# Patient Record
Sex: MALE | Race: WHITE | NOT HISPANIC OR LATINO | Employment: OTHER | URBAN - METROPOLITAN AREA
[De-identification: names, ages, dates, MRNs, and addresses within clinical notes are randomized per-mention and may not be internally consistent; named-entity substitution may affect disease eponyms.]

---

## 2023-02-09 ENCOUNTER — OFFICE VISIT (OUTPATIENT)
Dept: GASTROENTEROLOGY | Facility: CLINIC | Age: 63
End: 2023-02-09
Payer: COMMERCIAL

## 2023-02-09 ENCOUNTER — TELEPHONE (OUTPATIENT)
Dept: GASTROENTEROLOGY | Facility: CLINIC | Age: 63
End: 2023-02-09

## 2023-02-09 VITALS
SYSTOLIC BLOOD PRESSURE: 132 MMHG | HEIGHT: 71 IN | WEIGHT: 206.4 LBS | BODY MASS INDEX: 28.9 KG/M2 | DIASTOLIC BLOOD PRESSURE: 86 MMHG

## 2023-02-09 DIAGNOSIS — K21.9 GASTROESOPHAGEAL REFLUX DISEASE, UNSPECIFIED WHETHER ESOPHAGITIS PRESENT: ICD-10-CM

## 2023-02-09 DIAGNOSIS — Z12.11 ENCOUNTER FOR SCREENING FOR MALIGNANT NEOPLASM OF COLON: ICD-10-CM

## 2023-02-09 DIAGNOSIS — K58.2 IRRITABLE BOWEL SYNDROME WITH BOTH CONSTIPATION AND DIARRHEA: Primary | ICD-10-CM

## 2023-02-09 DIAGNOSIS — R10.13 DYSPEPSIA: ICD-10-CM

## 2023-02-09 PROCEDURE — 99204 OFFICE O/P NEW MOD 45 MIN: CPT | Performed by: INTERNAL MEDICINE

## 2023-02-09 RX ORDER — FENOFIBRATE 160 MG/1
1 TABLET ORAL DAILY
COMMUNITY
Start: 2023-01-10 | End: 2023-02-09

## 2023-02-09 RX ORDER — PREDNISONE 20 MG/1
TABLET ORAL
COMMUNITY
Start: 2023-02-06

## 2023-02-09 RX ORDER — ALBUTEROL SULFATE 90 UG/1
AEROSOL, METERED RESPIRATORY (INHALATION)
COMMUNITY
Start: 2023-02-07

## 2023-02-09 RX ORDER — DICYCLOMINE HYDROCHLORIDE 10 MG/1
CAPSULE ORAL
COMMUNITY
Start: 2023-01-30

## 2023-02-09 RX ORDER — BACLOFEN 20 MG/1
TABLET ORAL
COMMUNITY
Start: 2022-11-16

## 2023-02-09 RX ORDER — FLUOCINONIDE TOPICAL SOLUTION USP, 0.05% 0.5 MG/ML
SOLUTION TOPICAL
COMMUNITY
Start: 2023-02-01

## 2023-02-09 RX ORDER — OMEPRAZOLE 40 MG/1
40 CAPSULE, DELAYED RELEASE ORAL DAILY
COMMUNITY
Start: 2022-03-02 | End: 2023-03-02

## 2023-02-09 RX ORDER — KETOCONAZOLE 20 MG/G
CREAM TOPICAL
COMMUNITY
Start: 2023-02-01

## 2023-02-09 RX ORDER — AZITHROMYCIN 250 MG/1
TABLET, FILM COATED ORAL
COMMUNITY
Start: 2023-02-06

## 2023-02-09 RX ORDER — CRISABOROLE 20 MG/G
OINTMENT TOPICAL SEE ADMIN INSTRUCTIONS
COMMUNITY
Start: 2023-02-03

## 2023-02-09 RX ORDER — ONDANSETRON 4 MG/1
TABLET, FILM COATED ORAL
COMMUNITY
Start: 2023-01-23 | End: 2023-02-09

## 2023-02-09 NOTE — TELEPHONE ENCOUNTER
Seen in the office today by Dr. Ferrara.  Scheduled EGD & Colonoscopy at [facility] 08/11/2023 at 12:30pm - arrive 11:30am.  Will mail instructions 2 months prior.

## 2023-02-09 NOTE — PROGRESS NOTES
"    PATIENT INFORMATION  Bryce Rai       - 1960    CHIEF COMPLAINT  Chief Complaint   Patient presents with   • Abdominal Pain   • Nausea   • Vomiting   • Constipation   • Diarrhea       HISTORY OF PRESENT ILLNESS  Here for Gi eval after his last eval was by Dr Felipe in 2014    His PPI is PRN wilfrid when he eats late and has had nocturnal regurgitation but has been eating better and earlier so only has to take PRN    Describes his regular BMs were 4-6 times a day but after his C-Spine surgery got back to his constipation but has skipped a day or 2 but only a rare dulcolax to get things going    Will then skip another day and replay the pattern.    Is on Abx for a \"lung infection\" NOT Pneumonia - was in [facility]- so no avilable records      REVIEWED PERTINENT RESULTS/ LABS  No results found for: CASEREPORT, FINALDX  Lab Results   Component Value Date    HGB 14.6 2021    MCV 92.1 2021     2021    ALT 49 2021    AST 34 2021    HGBA1C 5.1 2019    INR 1.1 2021    TRIG 147 2020      No results found.    REVIEW OF SYSTEMS  Review of Systems   Constitutional: Positive for chills and diaphoresis.   HENT: Negative.    Eyes: Negative.    Respiratory: Positive for cough, shortness of breath and wheezing.    Cardiovascular: Negative.    Gastrointestinal: Positive for abdominal distention, abdominal pain, constipation, diarrhea, nausea and vomiting.   Endocrine: Negative.    Genitourinary: Negative.    Musculoskeletal: Positive for arthralgias, back pain, joint swelling, myalgias, neck pain and neck stiffness.   Skin: Negative.    Allergic/Immunologic: Positive for environmental allergies.   Neurological: Negative.  Negative for syncope.   Hematological: Bruises/bleeds easily.   Psychiatric/Behavioral: Negative.          ACTIVE PROBLEMS  There are no problems to display for this patient.        PAST MEDICAL HISTORY  Past Medical History:   Diagnosis Date   • " Diverticulitis of colon    • Irritable bowel syndrome          SURGICAL HISTORY  Past Surgical History:   Procedure Laterality Date   • ACHILLES TENDON REPAIR Left    • APPENDECTOMY     • BACK SURGERY      L1-L2 fusion   • CHOLECYSTECTOMY     • HAND SURGERY Right    • NECK SURGERY           FAMILY HISTORY  Family History   Problem Relation Age of Onset   • Melanoma Mother          SOCIAL HISTORY  Social History     Occupational History   • Not on file   Tobacco Use   • Smoking status: Never     Passive exposure: Never   • Smokeless tobacco: Never   Vaping Use   • Vaping Use: Never used   Substance and Sexual Activity   • Alcohol use: Not Currently     Comment: hasnt had drink over 25 years   • Drug use: Never   • Sexual activity: Not Currently         CURRENT MEDICATIONS    Current Outpatient Medications:   •  azithromycin (ZITHROMAX) 250 MG tablet, , Disp: , Rfl:   •  baclofen (LIORESAL) 20 MG tablet, TAKE 1 TABLET BY MOUTH THREE TIMES DAILY AS NEEDED FOR NECK SPASM, Disp: , Rfl:   •  dicyclomine (BENTYL) 10 MG capsule, TAKE 1 CAPSULE BY MOUTH FOUR TIMES DAILY BEFORE MEALS AND AT NIGHT, Disp: , Rfl:   •  Eucrisa 2 % ointment, Apply  topically to the appropriate area as directed See Admin Instructions. Apply topically to the affected area twice daily, Disp: , Rfl:   •  fluocinonide (LIDEX) 0.05 % external solution, APPLY SEVERAL DROPS TO THE SCALP DAILY AS NEEDED FOR FLARES, Disp: , Rfl:   •  hydrocortisone 2.5 % cream, APPLY THIN LAYER TOPICALLY TO FACE TWICE DAILY AS NEEDED FOR FLARES, Disp: , Rfl:   •  ketoconazole (NIZORAL) 2 % cream, APPLY THIN LAYER TO RASH ON FACE AND EARS TWICE DAILY AS NEEDED FOR FLARES, Disp: , Rfl:   •  omeprazole (priLOSEC) 40 MG capsule, Take 40 mg by mouth Daily., Disp: , Rfl:   •  predniSONE (DELTASONE) 20 MG tablet, TAKE 1 TABLET BY MOUTH TWICE DAILY UNTIL GONE, Disp: , Rfl:   •  Ventolin  (90 Base) MCG/ACT inhaler, , Disp: , Rfl:     ALLERGIES  Neomycin and  "Penicillins    VITALS  Vitals:    02/09/23 0930   BP: 132/86   BP Location: Right arm   Patient Position: Sitting   Cuff Size: Adult   Weight: 93.6 kg (206 lb 6.4 oz)   Height: 180.3 cm (71\")       PHYSICAL EXAM  Debilities/Disabilities Identified: None  Emotional Behavior: Appropriate  Wt Readings from Last 3 Encounters:   02/09/23 93.6 kg (206 lb 6.4 oz)     Ht Readings from Last 1 Encounters:   02/09/23 180.3 cm (71\")     Body mass index is 28.79 kg/m².  Physical Exam  Constitutional:       Appearance: He is well-developed. He is not diaphoretic.   Eyes:      General: No scleral icterus.     Conjunctiva/sclera: Conjunctivae normal.      Pupils: Pupils are equal, round, and reactive to light.   Neck:      Thyroid: No thyromegaly.   Cardiovascular:      Rate and Rhythm: Normal rate and regular rhythm.      Heart sounds: Normal heart sounds. No murmur heard.    No gallop.   Pulmonary:      Effort: Pulmonary effort is normal.      Breath sounds: Normal breath sounds. No wheezing or rales.   Abdominal:      General: Bowel sounds are normal. There is no distension or abdominal bruit.      Palpations: Abdomen is soft. There is no shifting dullness, fluid wave or mass.      Tenderness: There is abdominal tenderness in the right lower quadrant, epigastric area and left upper quadrant. There is no guarding. Negative signs include Samson's sign.      Hernia: There is no hernia in the ventral area.   Musculoskeletal:         General: Normal range of motion.      Cervical back: Normal range of motion and neck supple.   Lymphadenopathy:      Cervical: No cervical adenopathy.   Skin:     General: Skin is warm and dry.      Findings: No erythema or rash.   Neurological:      Mental Status: He is alert and oriented to person, place, and time.         CLINICAL DATA REVIEWED   reviewed previous lab results and integrated with today's visit, reviewed notes from other physicians and/or last GI encounter, reviewed previous endoscopy " results and available photos, reviewed surgical pathology results from previous biopsies    ASSESSMENT  Diagnoses and all orders for this visit:    Irritable bowel syndrome with both constipation and diarrhea    Dyspepsia  -     Case Request; Standing  -     Case Request    Gastroesophageal reflux disease, unspecified whether esophagitis present    Encounter for screening for malignant neoplasm of colon  -     Case Request; Standing  -     Case Request    Other orders  -     Ventolin  (90 Base) MCG/ACT inhaler  -     azithromycin (ZITHROMAX) 250 MG tablet  -     baclofen (LIORESAL) 20 MG tablet; TAKE 1 TABLET BY MOUTH THREE TIMES DAILY AS NEEDED FOR NECK SPASM  -     Eucrisa 2 % ointment; Apply  topically to the appropriate area as directed See Admin Instructions. Apply topically to the affected area twice daily  -     dicyclomine (BENTYL) 10 MG capsule; TAKE 1 CAPSULE BY MOUTH FOUR TIMES DAILY BEFORE MEALS AND AT NIGHT  -     Discontinue: fenofibrate 160 MG tablet; Take 1 tablet by mouth Daily.  -     fluocinonide (LIDEX) 0.05 % external solution; APPLY SEVERAL DROPS TO THE SCALP DAILY AS NEEDED FOR FLARES  -     hydrocortisone 2.5 % cream; APPLY THIN LAYER TOPICALLY TO FACE TWICE DAILY AS NEEDED FOR FLARES  -     ketoconazole (NIZORAL) 2 % cream; APPLY THIN LAYER TO RASH ON FACE AND EARS TWICE DAILY AS NEEDED FOR FLARES  -     omeprazole (priLOSEC) 40 MG capsule; Take 40 mg by mouth Daily.  -     Discontinue: ondansetron (ZOFRAN) 4 MG tablet; TAKE 1 TABLET BY MOUTH EVERY 8 HOURS FOR UP TO 7 DAYS AS NEEDED FOR NAUSEA  -     predniSONE (DELTASONE) 20 MG tablet; TAKE 1 TABLET BY MOUTH TWICE DAILY UNTIL GONE  -     Follow Anesthesia Guidelines / Protocol; Future  -     Obtain Informed Consent; Standing          PLAN  Would encourage daily Fiber and Probiotic as wel as increasing his Fluid intake and aamir proceed to his Endoscpy    Return in about 4 months (around 6/9/2023).    I have discussed the above plan  with the patient.  They verbalize understanding and are in agreement with the plan.  They have been advised to contact the office for any questions, concerns, or changes related to their health.

## 2023-02-10 ENCOUNTER — PATIENT ROUNDING (BHMG ONLY) (OUTPATIENT)
Dept: GASTROENTEROLOGY | Facility: CLINIC | Age: 63
End: 2023-02-10
Payer: COMMERCIAL

## 2023-02-10 NOTE — PROGRESS NOTES
February 10, 2023    Hello, may I speak with Bryce Rai?    My name is mAy Haddad     I am  with [facility]  [address]  [facility] [address] [phone].    Before we get started may I verify your date of birth? 1960    I am calling to officially welcome you to our practice and ask about your recent visit. Is this a good time to talk? yes    Tell me about your visit with us. What things went well?  the doctor was awesome, staff was very polite I would recommend them to anyone who needed a gastroenterologist.     We're always looking for ways to make our patients' experiences even better. Do you have recommendations on ways we may improve?  no    Overall were you satisfied with your first visit to our practice? yes       I appreciate you taking the time to speak with me today. Is there anything else I can do for you? no      Thank you, and have a great day.

## 2023-03-21 ENCOUNTER — TELEPHONE (OUTPATIENT)
Dept: GASTROENTEROLOGY | Facility: CLINIC | Age: 63
End: 2023-03-21
Payer: COMMERCIAL

## 2023-03-21 NOTE — TELEPHONE ENCOUNTER
Patient called and was requesting something for nausea.   LOV 2/9/2023    He was also transferred to [facility] to try and move up his scope.

## 2023-03-22 ENCOUNTER — TELEPHONE (OUTPATIENT)
Dept: GASTROENTEROLOGY | Facility: CLINIC | Age: 63
End: 2023-03-22
Payer: COMMERCIAL

## 2023-03-22 DIAGNOSIS — R11.0 NAUSEA: Primary | ICD-10-CM

## 2023-03-22 NOTE — TELEPHONE ENCOUNTER
PT called back needing to speak to someone regarding his appt on his Scope.    Transferred to [facility] for further assistane

## 2023-03-22 NOTE — TELEPHONE ENCOUNTER
Caller: Bryce Rai    Relationship to patient: Self    Best call back number: [phone]    Patient is needing: PATIENT WOULD LIKE TO RESCHEDULE HIS COLONOSCOPY FROM 3- TO THE NEXT AVAILABLE APPT.

## 2023-03-22 NOTE — TELEPHONE ENCOUNTER
Spoke with patient.  He states stomach issues are worse.  A lot of nausea and upset stomach.  He states on PPI, but could not remember the name, however none thing is listed on his medication list.    Rescheduled to [facility] 03/30/2023 arrive 12pm.  E-mail instructions [email or web address] today.

## 2023-03-28 ENCOUNTER — TELEPHONE (OUTPATIENT)
Dept: GASTROENTEROLOGY | Facility: CLINIC | Age: 63
End: 2023-03-28
Payer: COMMERCIAL

## 2023-03-28 NOTE — TELEPHONE ENCOUNTER
Records in chart.  Was given Keflex and Prednisone.    Offered to reschedule.      Please review and advise!

## 2023-03-28 NOTE — TELEPHONE ENCOUNTER
Patient called stating scheduled for  procedures on Thursday, 03/30/2023.  Went to [facility] for sinus infections, ear ache, chest congestion & cough on Thursday 03/23/2023 and again on Sunday 03/26/2023.  Was given antibiotics and Steroids .  He states coughing so bad it wakes me up at night.    Will call for records and check with Dr. Ferrara.    Called for records at [facility], spoke with Carmela.  Seen in ER 03/23/2023 and again 03/26/2023.  Will fax records

## 2023-03-28 NOTE — TELEPHONE ENCOUNTER
Spoke with patient.  Explained need to reschedule.    Scheduled at [facility] 06/09/2023 at 10:15am - arrive 9am.  Will mail new instructions.

## 2023-03-30 ENCOUNTER — TELEPHONE (OUTPATIENT)
Dept: GASTROENTEROLOGY | Facility: CLINIC | Age: 63
End: 2023-03-30
Payer: COMMERCIAL

## 2023-03-30 NOTE — TELEPHONE ENCOUNTER
Pt called stated that he feels weak, nauseated. No vomiting has had diarrhea.     Started antibiotic on 3-.     Would like something called in.

## 2023-03-30 NOTE — TELEPHONE ENCOUNTER
Patient currently on antibiotic and steroids for URI per PCP.  LM on patient VM to contact PCP and advise of weakness and nausea, as these symptoms may, actually, be related to the URI and the meds RXd for this.

## 2023-04-24 ENCOUNTER — TELEPHONE (OUTPATIENT)
Dept: GASTROENTEROLOGY | Facility: CLINIC | Age: 63
End: 2023-04-24
Payer: COMMERCIAL

## 2023-04-24 NOTE — TELEPHONE ENCOUNTER
PT called in feeling really sick.    He would like to see if he can get a med for his constant nausea and vomiting. Stated the last one he got cause a reaction and he couldn't continue it.    PT also would like to see if he can get his scheduled procedures moved up. He is having a lot of symptoms and feels really bad.  PT states he is constantly nauseated and vomiting, cramps, can't eat, goes days with out being about to eat anything, and is rapidly loosing weight, feel horrible.    Please call back to address the nausea meds and probability in moving his procedures up    C/b @[phone]

## 2023-04-25 DIAGNOSIS — R11.2 NAUSEA AND VOMITING, UNSPECIFIED VOMITING TYPE: Primary | ICD-10-CM

## 2023-04-25 DIAGNOSIS — R11.2 NAUSEA AND VOMITING, UNSPECIFIED VOMITING TYPE: ICD-10-CM

## 2023-04-25 DIAGNOSIS — K21.9 GASTROESOPHAGEAL REFLUX DISEASE, UNSPECIFIED WHETHER ESOPHAGITIS PRESENT: Primary | ICD-10-CM

## 2023-04-25 RX ORDER — OMEPRAZOLE 40 MG/1
40 CAPSULE, DELAYED RELEASE ORAL DAILY
Qty: 90 CAPSULE | Refills: 3 | Status: SHIPPED | OUTPATIENT
Start: 2023-04-25

## 2023-04-25 RX ORDER — PROMETHAZINE HYDROCHLORIDE 25 MG/1
25 SUPPOSITORY RECTAL EVERY 6 HOURS PRN
Qty: 30 SUPPOSITORY | Refills: 0 | Status: SHIPPED | OUTPATIENT
Start: 2023-04-25

## 2023-06-06 ENCOUNTER — TELEPHONE (OUTPATIENT)
Dept: GASTROENTEROLOGY | Facility: CLINIC | Age: 63
End: 2023-06-06
Payer: COMMERCIAL

## 2023-06-06 NOTE — TELEPHONE ENCOUNTER
Patient called for copy of his prep instructions.  He states never received any or unable to locate.  On 03/29/2023, were sent by [facility].    E-mailed to him [email or web address] today.

## 2023-06-08 ENCOUNTER — ANESTHESIA EVENT (OUTPATIENT)
Dept: PERIOP | Facility: HOSPITAL | Age: 63
End: 2023-06-08
Payer: COMMERCIAL

## 2023-06-09 ENCOUNTER — ANESTHESIA (OUTPATIENT)
Dept: PERIOP | Facility: HOSPITAL | Age: 63
End: 2023-06-09
Payer: COMMERCIAL

## 2023-06-09 ENCOUNTER — HOSPITAL ENCOUNTER (OUTPATIENT)
Facility: HOSPITAL | Age: 63
Setting detail: HOSPITAL OUTPATIENT SURGERY
Discharge: HOME | End: 2023-06-09
Attending: INTERNAL MEDICINE | Admitting: INTERNAL MEDICINE
Payer: COMMERCIAL

## 2023-06-09 VITALS
WEIGHT: 207.8 LBS | OXYGEN SATURATION: 98 % | DIASTOLIC BLOOD PRESSURE: 98 MMHG | SYSTOLIC BLOOD PRESSURE: 129 MMHG | HEIGHT: 71 IN | TEMPERATURE: 97.7 F | HEART RATE: 50 BPM | RESPIRATION RATE: 16 BRPM | BODY MASS INDEX: 29.09 KG/M2

## 2023-06-09 DIAGNOSIS — R10.13 DYSPEPSIA: ICD-10-CM

## 2023-06-09 DIAGNOSIS — Z12.11 ENCOUNTER FOR SCREENING FOR MALIGNANT NEOPLASM OF COLON: ICD-10-CM

## 2023-06-09 PROCEDURE — 45380 COLONOSCOPY AND BIOPSY: CPT | Performed by: INTERNAL MEDICINE

## 2023-06-09 PROCEDURE — 88305 TISSUE EXAM BY PATHOLOGIST: CPT | Performed by: INTERNAL MEDICINE

## 2023-06-09 PROCEDURE — 88313 SPECIAL STAINS GROUP 2: CPT | Performed by: INTERNAL MEDICINE

## 2023-06-09 PROCEDURE — 43239 EGD BIOPSY SINGLE/MULTIPLE: CPT | Performed by: INTERNAL MEDICINE

## 2023-06-09 PROCEDURE — 25010000002 PROPOFOL 200 MG/20ML EMULSION: Performed by: REGISTERED NURSE

## 2023-06-09 RX ORDER — PROPOFOL 10 MG/ML
INJECTION, EMULSION INTRAVENOUS AS NEEDED
Status: DISCONTINUED | OUTPATIENT
Start: 2023-06-09 | End: 2023-06-09 | Stop reason: SURG

## 2023-06-09 RX ORDER — SODIUM CHLORIDE 0.9 % (FLUSH) 0.9 %
10 SYRINGE (ML) INJECTION EVERY 12 HOURS SCHEDULED
Status: DISCONTINUED | OUTPATIENT
Start: 2023-06-09 | End: 2023-06-09 | Stop reason: HOSPADM

## 2023-06-09 RX ORDER — ONDANSETRON 2 MG/ML
4 INJECTION INTRAMUSCULAR; INTRAVENOUS ONCE AS NEEDED
Status: DISCONTINUED | OUTPATIENT
Start: 2023-06-09 | End: 2023-06-09 | Stop reason: HOSPADM

## 2023-06-09 RX ORDER — SODIUM CHLORIDE 9 MG/ML
40 INJECTION, SOLUTION INTRAVENOUS AS NEEDED
Status: DISCONTINUED | OUTPATIENT
Start: 2023-06-09 | End: 2023-06-09 | Stop reason: HOSPADM

## 2023-06-09 RX ORDER — LIDOCAINE HYDROCHLORIDE 20 MG/ML
INJECTION, SOLUTION INFILTRATION; PERINEURAL AS NEEDED
Status: DISCONTINUED | OUTPATIENT
Start: 2023-06-09 | End: 2023-06-09 | Stop reason: SURG

## 2023-06-09 RX ORDER — SODIUM CHLORIDE 0.9 % (FLUSH) 0.9 %
10 SYRINGE (ML) INJECTION AS NEEDED
Status: DISCONTINUED | OUTPATIENT
Start: 2023-06-09 | End: 2023-06-09 | Stop reason: HOSPADM

## 2023-06-09 RX ORDER — MEPERIDINE HYDROCHLORIDE 25 MG/ML
12.5 INJECTION INTRAMUSCULAR; INTRAVENOUS; SUBCUTANEOUS
Status: DISCONTINUED | OUTPATIENT
Start: 2023-06-09 | End: 2023-06-09 | Stop reason: HOSPADM

## 2023-06-09 RX ORDER — SODIUM CHLORIDE, SODIUM LACTATE, POTASSIUM CHLORIDE, CALCIUM CHLORIDE 600; 310; 30; 20 MG/100ML; MG/100ML; MG/100ML; MG/100ML
9 INJECTION, SOLUTION INTRAVENOUS CONTINUOUS PRN
Status: DISCONTINUED | OUTPATIENT
Start: 2023-06-09 | End: 2023-06-09 | Stop reason: HOSPADM

## 2023-06-09 RX ORDER — SODIUM CHLORIDE, SODIUM LACTATE, POTASSIUM CHLORIDE, CALCIUM CHLORIDE 600; 310; 30; 20 MG/100ML; MG/100ML; MG/100ML; MG/100ML
100 INJECTION, SOLUTION INTRAVENOUS CONTINUOUS
Status: DISCONTINUED | OUTPATIENT
Start: 2023-06-09 | End: 2023-06-09 | Stop reason: HOSPADM

## 2023-06-09 RX ORDER — LIDOCAINE HYDROCHLORIDE 10 MG/ML
0.5 INJECTION, SOLUTION EPIDURAL; INFILTRATION; INTRACAUDAL; PERINEURAL ONCE AS NEEDED
Status: DISCONTINUED | OUTPATIENT
Start: 2023-06-09 | End: 2023-06-09 | Stop reason: HOSPADM

## 2023-06-09 RX ADMIN — PROPOFOL INJECTABLE EMULSION 100 MG: 10 INJECTION, EMULSION INTRAVENOUS at 10:04

## 2023-06-09 RX ADMIN — LIDOCAINE HYDROCHLORIDE 10 MG: 20 INJECTION, SOLUTION INFILTRATION; PERINEURAL at 10:01

## 2023-06-09 RX ADMIN — LIDOCAINE HYDROCHLORIDE 50 MG: 20 INJECTION, SOLUTION INFILTRATION; PERINEURAL at 09:59

## 2023-06-09 RX ADMIN — LIDOCAINE HYDROCHLORIDE 50 MG: 20 INJECTION, SOLUTION INFILTRATION; PERINEURAL at 09:58

## 2023-06-09 RX ADMIN — LIDOCAINE HYDROCHLORIDE 100 MG: 20 INJECTION, SOLUTION INFILTRATION; PERINEURAL at 10:03

## 2023-06-09 RX ADMIN — PROPOFOL INJECTABLE EMULSION 100 MG: 10 INJECTION, EMULSION INTRAVENOUS at 09:58

## 2023-06-09 RX ADMIN — PROPOFOL INJECTABLE EMULSION 100 MG: 10 INJECTION, EMULSION INTRAVENOUS at 10:00

## 2023-06-09 RX ADMIN — PROPOFOL INJECTABLE EMULSION 75 MCG/KG/MIN: 10 INJECTION, EMULSION INTRAVENOUS at 10:05

## 2023-06-09 RX ADMIN — SODIUM CHLORIDE, POTASSIUM CHLORIDE, SODIUM LACTATE AND CALCIUM CHLORIDE 9 ML/HR: 600; 310; 30; 20 INJECTION, SOLUTION INTRAVENOUS at 09:35

## 2023-06-09 RX ADMIN — LIDOCAINE HYDROCHLORIDE 50 MG: 20 INJECTION, SOLUTION INFILTRATION; PERINEURAL at 10:04

## 2023-06-09 RX ADMIN — PROPOFOL INJECTABLE EMULSION 100 MG: 10 INJECTION, EMULSION INTRAVENOUS at 10:02

## 2023-06-09 NOTE — ANESTHESIA PREPROCEDURE EVALUATION
Anesthesia Evaluation     Patient summary reviewed and Nursing notes reviewed   no history of anesthetic complications:   NPO Solid Status: > 8 hours  NPO Liquid Status: > 8 hours           Airway   Mallampati: II  TM distance: >3 FB  Neck ROM: full  No difficulty expected  Dental          Pulmonary - normal exam    breath sounds clear to auscultation  (+) a smoker Former, asthma (Inhaler PRN 2x/week),  Cardiovascular - negative cardio ROS and normal exam  Exercise tolerance: good (4-7 METS)    Rhythm: regular  Rate: normal        Neuro/Psych- negative ROS  GI/Hepatic/Renal/Endo    (+) GERD well controlled    Musculoskeletal     (+) back pain (Lumbar fusion), neck pain (Cervical fusion)  Abdominal  - normal exam   Substance History - negative use     OB/GYN          Other - negative ROS           Phys Exam Other: SB 51                Anesthesia Plan    ASA 2     MAC     intravenous induction     Anesthetic plan, risks, benefits, and alternatives have been provided, discussed and informed consent has been obtained with: patient.    Use of blood products discussed with patient  Consented to blood products.      CODE STATUS:

## 2023-06-09 NOTE — BRIEF OP NOTE
ESOPHAGOGASTRODUODENOSCOPY WITH BIOPSY, COLONOSCOPY WITH POLYPECTOMY  Progress Note    Bryce Rai  6/9/2023    Pre-op Diagnosis:   Dyspepsia [R10.13]  Encounter for screening for malignant neoplasm of colon [Z12.11]       Post-Op Diagnosis Codes:     * Dyspepsia [R10.13]     * Encounter for screening for malignant neoplasm of colon [Z12.11]     * Duodenitis [535.6]     * Gastritis [K29.70]     * Reflux esophagitis [K21.00]     * Diverticulosis [K57.90]     * Colon polyp [K63.5]    Procedure/CPT® Codes:        Procedure(s):  ESOPHAGOGASTRODUODENOSCOPY WITH BIOPSY  COLONOSCOPY WITH POLYPECTOMY              Surgeon(s):  Dann Ferrara MD    Anesthesia: Monitored Anesthesia Care    Staff:   Circulator: Aurora Garcia RN; Sujata Velazquez RN  Scrub Person: Rita Hurd; Miguel Boyce         Estimated Blood Loss: none    Urine Voided: * No values recorded between 6/9/2023  9:53 AM and 6/9/2023 10:29 AM *    Specimens:                Specimens       ID Source Type Tests Collected By Collected At Frozen?    A Small Intestine, Duodenum Tissue TISSUE PATHOLOGY EXAM   Dann Ferrara MD 6/9/23 1004     Description: biopsy    This specimen was not marked as sent.    B Gastric, Body Tissue TISSUE PATHOLOGY EXAM   Dann Ferrara MD 6/9/23 1005     Description: gastric biopsy    This specimen was not marked as sent.    C Esophagus, Distal Tissue TISSUE PATHOLOGY EXAM   Dann Ferrara MD 6/9/23 1005     Description: distal esoph biopsy    This specimen was not marked as sent.    D Large Intestine, Transverse Colon Polyp TISSUE PATHOLOGY EXAM   Dann Ferrara MD 6/9/23 1020     Description: TRANSVERSE POLYP-FORCEP    This specimen was not marked as sent.                  Drains: * No LDAs found *    Findings: Mild Duodenitis-Biopsy  Gastritis-Biopsy  Reflux Esophagitis-biopsy    Colon to TI Good prep  Sigmoid  Diverticulosis  Polyp-Biopsy        Complications: None          Dann Ferrara MD     Date: 6/9/2023  Time: 10:30 EDT

## 2023-06-09 NOTE — H&P
Patient Care Team:  Thomas Lopez MD as PCP - General (Family Medicine)    CHIEF COMPLAINT: Screening CRC and dyspepsia    HISTORY OF PRESENT ILLNESS:  Last Colon was 2014, increased his PPI to daily from PRN    Past Medical History:   Diagnosis Date    Asthma     Diverticulitis of colon     GERD (gastroesophageal reflux disease)     Irritable bowel syndrome      Past Surgical History:   Procedure Laterality Date    ACHILLES TENDON REPAIR Left     APPENDECTOMY      BACK SURGERY      L1-L2 fusion    CHOLECYSTECTOMY      HAND SURGERY Right     NECK SURGERY       Family History   Problem Relation Age of Onset    Melanoma Mother      Social History     Tobacco Use    Smoking status: Former     Types: Cigarettes     Passive exposure: Never    Smokeless tobacco: Never    Tobacco comments:     Stop 2000   Vaping Use    Vaping Use: Never used   Substance Use Topics    Alcohol use: Not Currently     Comment: hasnt had drink over 25 years    Drug use: Never     Medications Prior to Admission   Medication Sig Dispense Refill Last Dose    baclofen (LIORESAL) 20 MG tablet TAKE 1 TABLET BY MOUTH THREE TIMES DAILY AS NEEDED FOR NECK SPASM   Past Month    ketoconazole (NIZORAL) 2 % cream APPLY THIN LAYER TO RASH ON FACE AND EARS TWICE DAILY AS NEEDED FOR FLARES   6/7/2023    omeprazole (priLOSEC) 40 MG capsule Take 1 capsule by mouth Daily. 90 capsule 3 Past Week    promethazine (PHENERGAN) 25 MG suppository Insert 1 suppository into the rectum Every 6 (Six) Hours As Needed for Nausea or Vomiting. 30 suppository 0 Past Month    Ventolin  (90 Base) MCG/ACT inhaler    Past Week     Allergies:  Neomycin and Penicillins    REVIEW OF SYSTEMS:  Please see the above history of present illness for pertinent positives and negatives.  The remainder of the patient's systems have been reviewed and are negative.     Vital Signs  Temp:  [97.6 °F (36.4 °C)] 97.6 °F (36.4 °C)  Heart Rate:  [52] 52  Resp:  [18] 18  BP: (110)/(86)  "110/86    Flowsheet Rows      Flowsheet Row First Filed Value   Admission Height 180.3 cm (71\") Documented at 06/08/2023 1159   Admission Weight 94.3 kg (207 lb 12.8 oz) Documented at 06/09/2023 0911             Physical Exam:  Physical Exam   Constitutional: Patient appears well-developed and well-nourished and in no acute distress   HEENT:   Head: Normocephalic and atraumatic.   Eyes:  Pupils are equal, round, and reactive to light. EOM are intact. Sclerae are anicteric and non-injected.  Mouth and Throat: Patient has moist mucous membranes. Oropharynx is clear of any erythema or exudate.     Neck: Neck supple. No JVD present. No thyromegaly present. No lymphadenopathy present.  Cardiovascular: Regular rate, regular rhythm, S1 normal and S2 normal.  Exam reveals no gallop and no friction rub.  No murmur heard.  Pulmonary/Chest: Lungs are clear to auscultation bilaterally. No respiratory distress. No wheezes. No rhonchi. No rales.   Abdominal: Soft. Bowel sounds are normal. No distension and no mass. There is no hepatosplenomegaly. There is no tenderness.   Musculoskeletal: Normal Muscle tone  Extremities: No edema. Pulses are palpable in all 4 extremities.  Neurological: Patient is alert and oriented to person, place, and time. Cranial nerves II-XII are grossly intact with no focal deficits.  Skin: Skin is warm. No rash noted. Nails show no clubbing.  No cyanosis or erythema.    Debilities/Disabilities Identified: None  Emotional Behavior: Appropriate     Results Review:   I reviewed the patient's new clinical results.    Lab Results (most recent)       None            Imaging Results (Most Recent)       None          reviewed    ECG/EMG Results (most recent)       None          reviewed    Assessment & Plan   Screening CRC and dyspepsia/  EGD and colonoscopy      I discussed the patient's findings and my recommendations with patient.     Dann Ferrara MD  06/09/23  09:51 EDT    Time: 10 min prior to " procedure.

## 2023-06-09 NOTE — ANESTHESIA POSTPROCEDURE EVALUATION
Patient: Bryce Rai    Procedure Summary       Date: 06/09/23 Room / Location: [facility] ENDOSCOPY 1 /  [facility]    Anesthesia Start: 0953 Anesthesia Stop: 1029    Procedures:       ESOPHAGOGASTRODUODENOSCOPY WITH BIOPSY (Esophagus)      COLONOSCOPY WITH POLYPECTOMY Diagnosis:       Dyspepsia      Encounter for screening for malignant neoplasm of colon      Duodenitis      Gastritis      Reflux esophagitis      Diverticulosis      Colon polyp      (Dyspepsia [R10.13])      (Encounter for screening for malignant neoplasm of colon [Z12.11])    Surgeons: Dann Ferrara MD Provider: Chaim Leon CRNA    Anesthesia Type: MAC ASA Status: 2            Anesthesia Type: MAC    Vitals  Vitals Value Taken Time   /98 06/09/23 1113   Temp 97.7 °F (36.5 °C) 06/09/23 1031   Pulse 50 06/09/23 1113   Resp 16 06/09/23 1113   SpO2 98 % 06/09/23 1113           Post Anesthesia Care and Evaluation    Patient location during evaluation: PHASE II  Patient participation: complete - patient participated  Level of consciousness: awake and alert  Pain score: 0  Pain management: adequate    Airway patency: patent  Anesthetic complications: No anesthetic complications  PONV Status: none  Cardiovascular status: acceptable  Respiratory status: acceptable  Hydration status: acceptable

## 2023-06-12 LAB
LAB AP CASE REPORT: NORMAL
PATH REPORT.FINAL DX SPEC: NORMAL
PATH REPORT.GROSS SPEC: NORMAL

## 2023-09-27 ENCOUNTER — HOSPITAL ENCOUNTER (EMERGENCY)
Facility: HOSPITAL | Age: 63
Discharge: HOME | End: 2023-09-27
Attending: EMERGENCY MEDICINE | Admitting: EMERGENCY MEDICINE
Payer: COMMERCIAL

## 2023-09-27 ENCOUNTER — APPOINTMENT (OUTPATIENT)
Dept: MRI IMAGING | Facility: HOSPITAL | Age: 63
End: 2023-09-27
Payer: COMMERCIAL

## 2023-09-27 VITALS
WEIGHT: 196 LBS | SYSTOLIC BLOOD PRESSURE: 168 MMHG | TEMPERATURE: 98.2 F | DIASTOLIC BLOOD PRESSURE: 115 MMHG | BODY MASS INDEX: 27.44 KG/M2 | HEIGHT: 71 IN | HEART RATE: 68 BPM | OXYGEN SATURATION: 98 % | RESPIRATION RATE: 15 BRPM

## 2023-09-27 DIAGNOSIS — M54.10 RADICULOPATHY, UNSPECIFIED SPINAL REGION: Primary | ICD-10-CM

## 2023-09-27 PROCEDURE — 99284 EMERGENCY DEPT VISIT MOD MDM: CPT

## 2023-09-27 PROCEDURE — 72141 MRI NECK SPINE W/O DYE: CPT

## 2023-09-27 RX ORDER — HYDROCODONE BITARTRATE AND ACETAMINOPHEN 5; 325 MG/1; MG/1
1 TABLET ORAL ONCE
Status: COMPLETED | OUTPATIENT
Start: 2023-09-27 | End: 2023-09-27

## 2023-09-27 RX ORDER — HYDROCODONE BITARTRATE AND ACETAMINOPHEN 5; 325 MG/1; MG/1
1 TABLET ORAL EVERY 6 HOURS PRN
Qty: 30 TABLET | Refills: 0 | Status: SHIPPED | OUTPATIENT
Start: 2023-09-27

## 2023-09-27 RX ADMIN — HYDROCODONE BITARTRATE AND ACETAMINOPHEN 1 TABLET: 5; 325 TABLET ORAL at 19:19

## 2023-09-27 NOTE — DISCHARGE INSTRUCTIONS
Take Colace as needed as directed for constipation if you are taking the [place] for pain.  Call Dr. Luna, your neurosurgeon in the morning, to be seen without fail.  Go to [facility], where you had your surgery, if you have worsening pain, fever, numbness, tingling, weakness, change in bladder or bowel function, worse in any way at all.  Take your steroid pack as directed by your neurosurgeon.

## 2023-09-27 NOTE — ED PROVIDER NOTES
Subjective   History of Present Illness  History of Present Illness    Chief complaint: Neck pain and arm numbness    Location: Neck    Quality/Severity: Left arm    Timing/Onset/Duration: Moderate pain    Modifying Factors: Started 4 days ago    Associated Symptoms: No fever or chills.  No leg weakness.  No change in bladder or bowel function    Narrative: This 63-year-old white female presents with neck pain that started 4 days ago.  Patient saw Dr. Luna 2 days ago and placed on steroids and started having hand numbness 2 days ago.  Patient called Dr. Luna today and he told the patient to go to the nearest ER to get an MRI of the cervical spine    PCP:Thomas Lopez MD     Neurosurgery: Jeremy    Review of Systems   Constitutional:  Positive for activity change.   Respiratory:  Negative for shortness of breath.    Cardiovascular:  Negative for chest pain.   Genitourinary:  Negative for difficulty urinating.   Musculoskeletal:  Positive for neck pain. Negative for back pain.   Neurological:  Positive for numbness. Negative for weakness.       Past Medical History:   Diagnosis Date    Asthma     Diverticulitis of colon     GERD (gastroesophageal reflux disease)     Irritable bowel syndrome        Allergies   Allergen Reactions    Neomycin Hives    Penicillins Hives       Past Surgical History:   Procedure Laterality Date    ACHILLES TENDON REPAIR Left     APPENDECTOMY      BACK SURGERY      L1-L2 fusion    CHOLECYSTECTOMY      COLONOSCOPY W/ POLYPECTOMY N/A 6/9/2023    Procedure: COLONOSCOPY WITH POLYPECTOMY;  Surgeon: Dann Ferrara MD;  Location: [facility] OR;  Service: Gastroenterology;  Laterality: N/A;  tics  CECAL TIME 1016    ENDOSCOPY N/A 6/9/2023    Procedure: ESOPHAGOGASTRODUODENOSCOPY WITH BIOPSY;  Surgeon: Dann Ferrara MD;  Location: [facility] OR;  Service: Gastroenterology;  Laterality: N/A;  gastritis, esophagitis, duodenitis  duodenal biopsy, gastric biopsy, distal esoph biopsy     HAND SURGERY Right     NECK SURGERY         Family History   Problem Relation Age of Onset    Melanoma Mother        Social History     Socioeconomic History    Marital status:    Tobacco Use    Smoking status: Former     Types: Cigarettes     Passive exposure: Never    Smokeless tobacco: Never    Tobacco comments:     Stop 2000   Vaping Use    Vaping Use: Never used   Substance and Sexual Activity    Alcohol use: Not Currently     Comment: hasnt had drink over 25 years    Drug use: Never    Sexual activity: Not Currently           Objective   Physical Exam  Vitals (The temperature is 98.4 °F, pulse 57, respirations 18, /121, room air pulse ox 95%) and nursing note reviewed.   Constitutional:       Appearance: Normal appearance.   HENT:      Head: Normocephalic and atraumatic.   Neck:      Comments: Is a moderate amount of tenderness upon palpation of cervical spine.  There is no bony step-off or deformity.    Musculoskeletal:         General: No tenderness.   Skin:     General: Skin is warm and dry.      Capillary Refill: Capillary refill takes less than 2 seconds.      Findings: No rash.   Neurological:      General: No focal deficit present.      Mental Status: He is alert and oriented to person, place, and time.      Cranial Nerves: No cranial nerve deficit.      Sensory: No sensory deficit.      Motor: No weakness.      Deep Tendon Reflexes: Reflexes abnormal (Decreased trapezius reflex on the left).      Comments: Patient has difficulty grasping with the left hand due to pain.       Procedures           ED Course        19:10 EDT, 09/27/23: The patient was reassessed.  He has no new complaints.  He is driving.  Neurological exam: Unchanged.  The repeat blood pressure is 168/115.    19:03 EDT, 09/27/23: I spoke with Lisa Dykes, on-call for Dr. Luna, the patient's neurosurgeon.  The plan will be to send the patient home with something for pain.  The patient should continue the steroid pack.  The  patient should call Dr. Luna in the morning for a follow-up without fail.  The patient should go to [facility], where he had his surgery, if he develops further pain, weakness, numbness, tingling, weakness, change in bladder or bowel function, fever, chills, worse in any way at all.  The patient will be given a prescription for Norco.  All the patient questions were answered he will be discharged in good condition.                                       Medical Decision Making  Problems Addressed:  Radiculopathy, unspecified spinal region: complicated acute illness or injury    Amount and/or Complexity of Data Reviewed  Radiology: ordered.    Risk  Prescription drug management.        Final diagnoses:   Radiculopathy, unspecified spinal region       ED Disposition  ED Disposition       None            No follow-up provider specified.       Medication List      No changes were made to your prescriptions during this visit.            Stephen Fischer MD  09/27/23 [id number]

## (undated) DEVICE — VIAL FORMALIN CAP 10P 40ML

## (undated) DEVICE — GLV SURG SENSICARE PI MIC PF SZ8 LF STRL

## (undated) DEVICE — SAFELINER SUCTION CANISTER 1000CC: Brand: DEROYAL

## (undated) DEVICE — THE BITE BLOCK MAXI, LATEX FREE STRAP IS USED TO PROTECT THE ENDOSCOPE INSERTION TUBE FROM BEING BITTEN BY THE PATIENT.

## (undated) DEVICE — SYR LL 3CC

## (undated) DEVICE — Device

## (undated) DEVICE — ADAPT CLN BIOGUARD AIR/H2O DISP

## (undated) DEVICE — FRCP BX RADJAW4 NDL 2.8 240CM LG OG BX40

## (undated) DEVICE — BW-412T DISP COMBO CLEANING BRUSH: Brand: SINGLE USE COMBINATION CLEANING BRUSH

## (undated) DEVICE — KT ORCA ORCAPOD DISP STRL

## (undated) DEVICE — SOL IRR H2O BTL 1000ML STRL

## (undated) DEVICE — JACKT LAB F/R KNIT CUFF/COLR XLG BLU